# Patient Record
Sex: FEMALE | Race: WHITE | NOT HISPANIC OR LATINO | Employment: PART TIME | URBAN - METROPOLITAN AREA
[De-identification: names, ages, dates, MRNs, and addresses within clinical notes are randomized per-mention and may not be internally consistent; named-entity substitution may affect disease eponyms.]

---

## 2023-03-29 ENCOUNTER — TRANSCRIBE ORDERS (OUTPATIENT)
Dept: ADMINISTRATIVE | Facility: HOSPITAL | Age: 68
End: 2023-03-29
Payer: MEDICARE

## 2023-03-29 DIAGNOSIS — N63.0 MASS OF BREAST, UNSPECIFIED LATERALITY: Primary | ICD-10-CM

## 2023-04-14 ENCOUNTER — HOSPITAL ENCOUNTER (OUTPATIENT)
Dept: MAMMOGRAPHY | Facility: HOSPITAL | Age: 68
Discharge: HOME | End: 2023-04-14
Payer: MEDICARE

## 2023-04-14 ENCOUNTER — HOSPITAL ENCOUNTER (OUTPATIENT)
Dept: ULTRASOUND IMAGING | Facility: HOSPITAL | Age: 68
Discharge: HOME | End: 2023-04-14
Payer: MEDICARE

## 2023-04-14 ENCOUNTER — APPOINTMENT (OUTPATIENT)
Dept: ULTRASOUND IMAGING | Facility: HOSPITAL | Age: 68
End: 2023-04-14
Payer: MEDICARE

## 2023-04-14 DIAGNOSIS — N63.0 MASS OF BREAST, UNSPECIFIED LATERALITY: ICD-10-CM

## 2023-04-14 PROCEDURE — 77066 DX MAMMO INCL CAD BI: CPT

## 2023-04-14 PROCEDURE — G0279 TOMOSYNTHESIS, MAMMO: HCPCS

## 2023-04-14 PROCEDURE — 76642 ULTRASOUND BREAST LIMITED: CPT

## 2023-12-27 ENCOUNTER — HOSPITAL ENCOUNTER (OUTPATIENT)
Facility: HOSPITAL | Age: 68
Setting detail: OBSERVATION
Discharge: HOME | End: 2023-12-28
Attending: EMERGENCY MEDICINE | Admitting: INTERNAL MEDICINE
Payer: MEDICARE

## 2023-12-27 ENCOUNTER — APPOINTMENT (OUTPATIENT)
Dept: CT IMAGING | Facility: HOSPITAL | Age: 68
End: 2023-12-27
Payer: MEDICARE

## 2023-12-27 DIAGNOSIS — R42 DIZZINESS: ICD-10-CM

## 2023-12-27 DIAGNOSIS — R42 VESTIBULAR DIZZINESS INVOLVING LEFT INNER EAR: Primary | ICD-10-CM

## 2023-12-27 LAB
ALBUMIN SERPL-MCNC: 4.6 G/DL (ref 3.5–5.2)
ALBUMIN/GLOB SERPL: 1.9 G/DL
ALP SERPL-CCNC: 85 U/L (ref 39–117)
ALT SERPL W P-5'-P-CCNC: 13 U/L (ref 1–33)
ANION GAP SERPL CALCULATED.3IONS-SCNC: 10 MMOL/L (ref 5–15)
AST SERPL-CCNC: 20 U/L (ref 1–32)
BASOPHILS # BLD AUTO: 0.1 10*3/MM3 (ref 0–0.2)
BASOPHILS NFR BLD AUTO: 0.6 % (ref 0–1.5)
BILIRUB SERPL-MCNC: 0.5 MG/DL (ref 0–1.2)
BUN SERPL-MCNC: 22 MG/DL (ref 8–23)
BUN/CREAT SERPL: 36.1 (ref 7–25)
CALCIUM SPEC-SCNC: 10.3 MG/DL (ref 8.6–10.5)
CHLORIDE SERPL-SCNC: 107 MMOL/L (ref 98–107)
CO2 SERPL-SCNC: 25 MMOL/L (ref 22–29)
CREAT SERPL-MCNC: 0.61 MG/DL (ref 0.57–1)
DEPRECATED RDW RBC AUTO: 42 FL (ref 37–54)
EGFRCR SERPLBLD CKD-EPI 2021: 96.9 ML/MIN/1.73
EOSINOPHIL # BLD AUTO: 0 10*3/MM3 (ref 0–0.4)
EOSINOPHIL NFR BLD AUTO: 0.1 % (ref 0.3–6.2)
ERYTHROCYTE [DISTWIDTH] IN BLOOD BY AUTOMATED COUNT: 13.7 % (ref 12.3–15.4)
GLOBULIN UR ELPH-MCNC: 2.4 GM/DL
GLUCOSE SERPL-MCNC: 145 MG/DL (ref 65–99)
HCT VFR BLD AUTO: 45.6 % (ref 34–46.6)
HGB BLD-MCNC: 14.8 G/DL (ref 12–15.9)
LYMPHOCYTES # BLD AUTO: 1 10*3/MM3 (ref 0.7–3.1)
LYMPHOCYTES NFR BLD AUTO: 10.3 % (ref 19.6–45.3)
MCH RBC QN AUTO: 29.1 PG (ref 26.6–33)
MCHC RBC AUTO-ENTMCNC: 32.6 G/DL (ref 31.5–35.7)
MCV RBC AUTO: 89.4 FL (ref 79–97)
MONOCYTES # BLD AUTO: 0.2 10*3/MM3 (ref 0.1–0.9)
MONOCYTES NFR BLD AUTO: 2.3 % (ref 5–12)
NEUTROPHILS NFR BLD AUTO: 8.2 10*3/MM3 (ref 1.7–7)
NEUTROPHILS NFR BLD AUTO: 86.7 % (ref 42.7–76)
NRBC BLD AUTO-RTO: 0 /100 WBC (ref 0–0.2)
PLATELET # BLD AUTO: 226 10*3/MM3 (ref 140–450)
PMV BLD AUTO: 7.5 FL (ref 6–12)
POTASSIUM SERPL-SCNC: 4.1 MMOL/L (ref 3.5–5.2)
PROT SERPL-MCNC: 7 G/DL (ref 6–8.5)
RBC # BLD AUTO: 5.1 10*6/MM3 (ref 3.77–5.28)
SODIUM SERPL-SCNC: 142 MMOL/L (ref 136–145)
WBC NRBC COR # BLD AUTO: 9.5 10*3/MM3 (ref 3.4–10.8)

## 2023-12-27 PROCEDURE — 70450 CT HEAD/BRAIN W/O DYE: CPT

## 2023-12-27 PROCEDURE — G0378 HOSPITAL OBSERVATION PER HR: HCPCS

## 2023-12-27 PROCEDURE — 80053 COMPREHEN METABOLIC PANEL: CPT | Performed by: EMERGENCY MEDICINE

## 2023-12-27 PROCEDURE — 99284 EMERGENCY DEPT VISIT MOD MDM: CPT

## 2023-12-27 PROCEDURE — 25810000003 SODIUM CHLORIDE 0.9 % SOLUTION: Performed by: EMERGENCY MEDICINE

## 2023-12-27 PROCEDURE — 96375 TX/PRO/DX INJ NEW DRUG ADDON: CPT

## 2023-12-27 PROCEDURE — 97162 PT EVAL MOD COMPLEX 30 MIN: CPT

## 2023-12-27 PROCEDURE — 25010000002 DIPHENHYDRAMINE PER 50 MG: Performed by: EMERGENCY MEDICINE

## 2023-12-27 PROCEDURE — 85025 COMPLETE CBC W/AUTO DIFF WBC: CPT | Performed by: EMERGENCY MEDICINE

## 2023-12-27 PROCEDURE — 96374 THER/PROPH/DIAG INJ IV PUSH: CPT

## 2023-12-27 PROCEDURE — 25010000002 METOCLOPRAMIDE PER 10 MG: Performed by: EMERGENCY MEDICINE

## 2023-12-27 RX ORDER — SODIUM CHLORIDE 0.9 % (FLUSH) 0.9 %
10 SYRINGE (ML) INJECTION AS NEEDED
Status: DISCONTINUED | OUTPATIENT
Start: 2023-12-27 | End: 2023-12-28 | Stop reason: HOSPADM

## 2023-12-27 RX ORDER — ACETAMINOPHEN 325 MG/1
650 TABLET ORAL EVERY 4 HOURS PRN
Status: DISCONTINUED | OUTPATIENT
Start: 2023-12-27 | End: 2023-12-28 | Stop reason: HOSPADM

## 2023-12-27 RX ORDER — MELOXICAM 15 MG/1
15 TABLET ORAL NIGHTLY
COMMUNITY

## 2023-12-27 RX ORDER — ATORVASTATIN CALCIUM 20 MG/1
20 TABLET, FILM COATED ORAL NIGHTLY
COMMUNITY

## 2023-12-27 RX ORDER — PROCHLORPERAZINE EDISYLATE 5 MG/ML
10 INJECTION INTRAMUSCULAR; INTRAVENOUS EVERY 6 HOURS PRN
Status: DISCONTINUED | OUTPATIENT
Start: 2023-12-27 | End: 2023-12-28 | Stop reason: HOSPADM

## 2023-12-27 RX ORDER — ATORVASTATIN CALCIUM 10 MG/1
10 TABLET, FILM COATED ORAL NIGHTLY
Status: DISCONTINUED | OUTPATIENT
Start: 2023-12-27 | End: 2023-12-28 | Stop reason: HOSPADM

## 2023-12-27 RX ORDER — DIPHENHYDRAMINE HYDROCHLORIDE 50 MG/ML
25 INJECTION INTRAMUSCULAR; INTRAVENOUS ONCE
Status: COMPLETED | OUTPATIENT
Start: 2023-12-27 | End: 2023-12-27

## 2023-12-27 RX ORDER — BISACODYL 5 MG/1
5 TABLET, DELAYED RELEASE ORAL DAILY PRN
Status: DISCONTINUED | OUTPATIENT
Start: 2023-12-27 | End: 2023-12-28 | Stop reason: HOSPADM

## 2023-12-27 RX ORDER — SODIUM CHLORIDE 9 MG/ML
40 INJECTION, SOLUTION INTRAVENOUS AS NEEDED
Status: DISCONTINUED | OUTPATIENT
Start: 2023-12-27 | End: 2023-12-28 | Stop reason: HOSPADM

## 2023-12-27 RX ORDER — ONDANSETRON 4 MG/1
4 TABLET, ORALLY DISINTEGRATING ORAL EVERY 6 HOURS PRN
Status: DISCONTINUED | OUTPATIENT
Start: 2023-12-27 | End: 2023-12-28 | Stop reason: HOSPADM

## 2023-12-27 RX ORDER — MELOXICAM 15 MG/1
15 TABLET ORAL NIGHTLY
Status: DISCONTINUED | OUTPATIENT
Start: 2023-12-27 | End: 2023-12-28 | Stop reason: HOSPADM

## 2023-12-27 RX ORDER — MECLIZINE HYDROCHLORIDE 25 MG/1
25 TABLET ORAL ONCE
Status: COMPLETED | OUTPATIENT
Start: 2023-12-27 | End: 2023-12-27

## 2023-12-27 RX ORDER — AMOXICILLIN 250 MG
2 CAPSULE ORAL 2 TIMES DAILY
Status: DISCONTINUED | OUTPATIENT
Start: 2023-12-27 | End: 2023-12-28 | Stop reason: HOSPADM

## 2023-12-27 RX ORDER — POLYETHYLENE GLYCOL 3350 17 G/17G
17 POWDER, FOR SOLUTION ORAL DAILY PRN
Status: DISCONTINUED | OUTPATIENT
Start: 2023-12-27 | End: 2023-12-28 | Stop reason: HOSPADM

## 2023-12-27 RX ORDER — METOCLOPRAMIDE HYDROCHLORIDE 5 MG/ML
10 INJECTION INTRAMUSCULAR; INTRAVENOUS ONCE
Status: COMPLETED | OUTPATIENT
Start: 2023-12-27 | End: 2023-12-27

## 2023-12-27 RX ORDER — MECLIZINE HYDROCHLORIDE 25 MG/1
25 TABLET ORAL 3 TIMES DAILY PRN
Status: DISCONTINUED | OUTPATIENT
Start: 2023-12-27 | End: 2023-12-28 | Stop reason: HOSPADM

## 2023-12-27 RX ORDER — HYDRALAZINE HYDROCHLORIDE 20 MG/ML
10 INJECTION INTRAMUSCULAR; INTRAVENOUS EVERY 6 HOURS PRN
Status: DISCONTINUED | OUTPATIENT
Start: 2023-12-27 | End: 2023-12-28 | Stop reason: HOSPADM

## 2023-12-27 RX ORDER — DIPHENHYDRAMINE HYDROCHLORIDE 50 MG/ML
25 INJECTION INTRAMUSCULAR; INTRAVENOUS EVERY 6 HOURS PRN
Status: DISCONTINUED | OUTPATIENT
Start: 2023-12-27 | End: 2023-12-28 | Stop reason: HOSPADM

## 2023-12-27 RX ORDER — SODIUM CHLORIDE 0.9 % (FLUSH) 0.9 %
10 SYRINGE (ML) INJECTION EVERY 12 HOURS SCHEDULED
Status: DISCONTINUED | OUTPATIENT
Start: 2023-12-27 | End: 2023-12-28 | Stop reason: HOSPADM

## 2023-12-27 RX ORDER — CETIRIZINE HYDROCHLORIDE 10 MG/1
10 TABLET ORAL NIGHTLY
COMMUNITY

## 2023-12-27 RX ORDER — CETIRIZINE HYDROCHLORIDE 10 MG/1
10 TABLET ORAL NIGHTLY
Status: DISCONTINUED | OUTPATIENT
Start: 2023-12-27 | End: 2023-12-28 | Stop reason: HOSPADM

## 2023-12-27 RX ORDER — BISACODYL 10 MG
10 SUPPOSITORY, RECTAL RECTAL DAILY PRN
Status: DISCONTINUED | OUTPATIENT
Start: 2023-12-27 | End: 2023-12-28 | Stop reason: HOSPADM

## 2023-12-27 RX ADMIN — METOCLOPRAMIDE 10 MG: 5 INJECTION, SOLUTION INTRAMUSCULAR; INTRAVENOUS at 15:48

## 2023-12-27 RX ADMIN — Medication 10 ML: at 20:24

## 2023-12-27 RX ADMIN — MECLIZINE HYDROCHLORIDE 25 MG: 25 TABLET ORAL at 12:10

## 2023-12-27 RX ADMIN — DIPHENHYDRAMINE HYDROCHLORIDE 25 MG: 50 INJECTION, SOLUTION INTRAMUSCULAR; INTRAVENOUS at 15:49

## 2023-12-27 RX ADMIN — MELOXICAM 15 MG: 15 TABLET ORAL at 22:09

## 2023-12-27 RX ADMIN — ATORVASTATIN CALCIUM 10 MG: 10 TABLET, FILM COATED ORAL at 22:09

## 2023-12-27 RX ADMIN — SODIUM CHLORIDE 1000 ML: 9 INJECTION, SOLUTION INTRAVENOUS at 15:48

## 2023-12-27 RX ADMIN — CETIRIZINE HYDROCHLORIDE 10 MG: 10 TABLET, FILM COATED ORAL at 22:09

## 2023-12-27 NOTE — ED PROVIDER NOTES
"Subjective   History of Present Illness  68-year-old female presents for dizziness.  Started yesterday.  No headache.  States it is really only when she goes to get up.  No headache.  No numbness or weakness.  No speech or vision changes.  Had similar symptoms previously once and was post to get outpatient follow-up with PT but felt better before hand so did not go.      Review of Systems  See HPI.  No past medical history on file.    Allergies   Allergen Reactions    Codeine GI Intolerance and Nausea And Vomiting    Erythromycin Nausea And Vomiting    Metronidazole GI Intolerance and Nausea And Vomiting       Past Surgical History:   Procedure Laterality Date    HYSTERECTOMY      REDUCTION MAMMAPLASTY         Family History   Problem Relation Age of Onset    Breast cancer Sister     Colon cancer Paternal Uncle        Social History     Socioeconomic History    Marital status:            Objective   Physical Exam  Constitutional:  No acute distress.  Head:  Atraumatic.  Normocephalic.   Eyes:  No scleral icterus. Normal conjunctivae  ENT:  Moist mucosa.  No nasal discharge present.  Cardiovascular:  Well perfused.  Equal pulses.  Regular rate.  Normal capillary refill.    Pulmonary/Chest:  No respiratory distress.  Airway patent.  No tachypnea.  No accessory muscle usage.    Abdominal:  Nondistended. Nontender.   Neurological:  Alert and oriented to person place time and situation. PERRL.  EOMI.  Cranial nerves II-XII are intact.  Strength is equal and 5/5 in the upper and lower extremities bilaterally.  No sensory deficits to light touch.  No pronator drift.  Normal speech.  Normal cerebellar function during finger-nose-finger and heel to shin.  Patient with abnormal gait, listing to mostly right but sometimes left on exam.        Procedures           ED Course      /66   Pulse 65   Temp 97.5 °F (36.4 °C) (Oral)   Resp 20   Ht 163.8 cm (64.5\")   Wt 73.9 kg (162 lb 14.7 oz)   SpO2 95%   BMI " 27.53 kg/m²   Labs Reviewed   COMPREHENSIVE METABOLIC PANEL - Abnormal; Notable for the following components:       Result Value    Glucose 145 (*)     BUN/Creatinine Ratio 36.1 (*)     All other components within normal limits    Narrative:     GFR Normal >60  Chronic Kidney Disease <60  Kidney Failure <15     CBC WITH AUTO DIFFERENTIAL - Abnormal; Notable for the following components:    Neutrophil % 86.7 (*)     Lymphocyte % 10.3 (*)     Monocyte % 2.3 (*)     Eosinophil % 0.1 (*)     Neutrophils, Absolute 8.20 (*)     All other components within normal limits   CBC AND DIFFERENTIAL    Narrative:     The following orders were created for panel order CBC & Differential.  Procedure                               Abnormality         Status                     ---------                               -----------         ------                     CBC Auto Differential[[id number]]        Abnormal            Final result                 Please view results for these tests on the individual orders.     Medications   sodium chloride 0.9 % flush 10 mL (has no administration in time range)   sodium chloride 0.9 % flush 10 mL (has no administration in time range)   sodium chloride 0.9 % flush 10 mL (has no administration in time range)   sodium chloride 0.9 % infusion 40 mL (has no administration in time range)   sennosides-docusate (PERICOLACE) 8.6-50 MG per tablet 2 tablet (has no administration in time range)     And   polyethylene glycol (MIRALAX) packet 17 g (has no administration in time range)     And   bisacodyl (DULCOLAX) EC tablet 5 mg (has no administration in time range)     And   bisacodyl (DULCOLAX) suppository 10 mg (has no administration in time range)   Potassium Replacement - Follow Nurse / BPA Driven Protocol (has no administration in time range)   Magnesium Standard Dose Replacement - Follow Nurse / BPA Driven Protocol (has no administration in time range)   Phosphorus Replacement - Follow Nurse / BPA  Driven Protocol (has no administration in time range)   Calcium Replacement - Follow Nurse / BPA Driven Protocol (has no administration in time range)   acetaminophen (TYLENOL) tablet 650 mg (has no administration in time range)   prochlorperazine (COMPAZINE) injection 10 mg (has no administration in time range)   ondansetron ODT (ZOFRAN-ODT) disintegrating tablet 4 mg (has no administration in time range)   diphenhydrAMINE (BENADRYL) injection 25 mg (has no administration in time range)   meclizine (ANTIVERT) tablet 25 mg (has no administration in time range)   hydrALAZINE (APRESOLINE) injection 10 mg (has no administration in time range)   meclizine (ANTIVERT) tablet 25 mg (25 mg Oral Given 12/27/23 1210)   metoclopramide (REGLAN) injection 10 mg (10 mg Intravenous Given 12/27/23 1548)   diphenhydrAMINE (BENADRYL) injection 25 mg (25 mg Intravenous Given 12/27/23 1549)   sodium chloride 0.9 % bolus 1,000 mL (1,000 mL Intravenous New Bag 12/27/23 1548)     CT Head Without Contrast    Result Date: 12/27/2023  Impression: 1. No acute intracranial abnormality. 2. Generalized cerebral volume loss. Electronically Signed: Toñito Aleaxnder MD  12/27/2023 4:24 PM EST  Workstation ID: [id number]                                          Medical Decision Making  Problems Addressed:  Dizziness: complicated acute illness or injury    Amount and/or Complexity of Data Reviewed  Labs: ordered.  Radiology: ordered.    Risk  Prescription drug management.  Decision regarding hospitalization.    My interpretation of CT head is no intracranial hemorrhage.  See above radiology interpretation.    Patient with nonfocal neuroexam.  Largely asymptomatic when lying down but when standing patient not significantly symptomatic.  PT seen but patient without significant improvement.  Patient repeatedly grasping the wall when ambulating.  Suspect that this is peripheral in etiology given exam, however patient having significant difficulty ambulating  here on my exam.  Has to go up flight of stairs at home.  Believe would benefit from further treatment.  Nausea improved with Reglan.  Excepted by Taylor with [employer] service.    Final diagnoses:   Dizziness       ED Disposition  ED Disposition       ED Disposition   Decision to Admit    Condition   --    Comment   Level of Care: Telemetry [5]   Diagnosis: Vertigo [293178]   Admitting Physician: ABIGAIL WARE [[id number]]   Attending Physician: ABIGAIL WARE [[id number]]                 No follow-up provider specified.       Medication List      No changes were made to your prescriptions during this visit.            Salo Cosby MD  12/27/23 1800

## 2023-12-27 NOTE — THERAPY EVALUATION
Patient Name: Elisa PETIT  : 1955    MRN: [id number]                              Today's Date: 2023       Admit Date: 2023    Visit Dx: No diagnosis found.  There is no problem list on file for this patient.    No past medical history on file.  Past Surgical History:   Procedure Laterality Date    HYSTERECTOMY      REDUCTION MAMMAPLASTY        General Information       Row Name 23 1608          Physical Therapy Time and Intention    Document Type evaluation  -MB     Mode of Treatment physical therapy  -MB       Row Name 23 1608          General Information    Patient Profile Reviewed yes  -MB     Prior Level of Function independent:;all household mobility;community mobility;gait;transfer;ADL's;home management;driving;work  -MB     Existing Precautions/Restrictions no known precautions/restrictions  -MB     Barriers to Rehab none identified  -MB       Row Name 23 1608          Living Environment    People in Home spouse  -MB       Row Name 23 1608          Home Main Entrance    Number of Stairs, Main Entrance two  -MB       Row Name 23 1608          Stairs Within Home, Primary    Stairs, Within Home, Primary flight to upstairs bedroom  -MB     Stair Railings, Within Home, Primary railings safe and in good condition  -MB       Row Name 23 1608          Cognition    Orientation Status (Cognition) oriented x 4  -MB       Row Name 23 1608          Safety Issues, Functional Mobility    Impairments Affecting Function (Mobility) balance;endurance/activity tolerance;postural/trunk control  -MB               User Key  (r) = Recorded By, (t) = Taken By, (c) = Cosigned By      Initials Name Provider Type    Alfredo Vega, PT Physical Therapist                   Mobility       Row Name 23 1609          Bed Mobility    Bed Mobility bed mobility (all) activities  -MB     All Activities, [place] (Bed Mobility) standby assist  -MB     Assistive  Device (Bed Mobility) head of bed elevated  -MB       Row Name 12/27/23 1609          Bed-Chair Transfer    Bed-Chair [place] (Transfers) standby assist  -MB     Comment, (Bed-Chair Transfer) no AD  -MB       Row Name 12/27/23 1609          Sit-Stand Transfer    Sit-Stand [place] (Transfers) standby assist  -MB     Comment, (Sit-Stand Transfer) no AD  -MB       Row Name 12/27/23 1609          Gait/Stairs (Locomotion)    [place] Level (Gait) standby assist  -MB     Patient was able to Ambulate yes  -MB     Distance in Feet (Gait) 50  -MB     Comment, (Gait/Stairs) 2x25' SBA with HHA intermittently to use the restroom  -MB               User Key  (r) = Recorded By, (t) = Taken By, (c) = Cosigned By      Initials Name Provider Type    Alfredo Vega, PT Physical Therapist                   Obj/Interventions       Row Name 12/27/23 1610          Range of Motion Comprehensive    General Range of Motion no range of motion deficits identified  -MB       Row Name 12/27/23 1610          Strength Comprehensive (MMT)    General Manual Muscle Testing (MMT) Assessment no strength deficits identified  -MB       Row Name 12/27/23 1610          Balance    Balance Assessment sitting static balance;sit to stand dynamic balance;sitting dynamic balance;standing static balance;standing dynamic balance  -MB     Static Sitting Balance independent  -MB     Dynamic Sitting Balance independent  -MB     Position, Sitting Balance unsupported;sitting edge of bed  -MB     Sit to Stand Dynamic Balance independent  -MB     Static Standing Balance independent  -MB     Dynamic Standing Balance standby assist;contact guard  -MB       Row Name 12/27/23 1610          Sensory Assessment (Somatosensory)    Sensory Assessment (Somatosensory) sensation intact  -MB               User Key  (r) = Recorded By, (t) = Taken By, (c) = Cosigned By      Initials Name Provider Type    Alfredo Vega, PT Physical Therapist                    Goals/Plan    No documentation.                  Clinical Impression       Row Name 12/27/23 1610          Pain    Pretreatment Pain Rating 0/10 - no pain  -MB     Posttreatment Pain Rating 0/10 - no pain  -MB       Row Name 12/27/23 1622 12/27/23 1610       Plan of Care Review    Plan of Care Reviewed With -- patient;spouse  -MB    Progress -- no change  -MB    Outcome Evaluation Pt is a 69 y/o F admitted to [facility] on 12/27/23 with complaints of dizziness and nausea that began yesterday afternoon. She describes the symptoms as room-spinning dizziness, worse with positional changes. Believes she was had vertigo before, was planning on receiving PT, but symptoms resolved prior to appointment. PT consulted for evaluation and treatment of dizziness symptoms. She is currently living in multi-level home with 2-step entry and flight to second story bedroom. At baseline, she is IND with all mobility, driving, and working part-time at vet clinic. This date, she is AAOx4 and lying supine in bed. She completes all mobility this date SBA and amb 2x25' SBA with no AD this date to use the restroom. She is mildly dizzy with ambulation, req intermittent usage of HHA during ambulation assessment. Education was provided on RW usage at home. During vestibular exam pt was negative with oculomotor testing, no abnormalities with VOR assessment, negative with orthostatic vital signs. However, she was mildly (+) for BBPV when assessing the [place]-Hallpike bilaterally. She was treated with the Epley maneuver 2x this date with very mild improvement of symptoms. It is also worth noting that her symptoms were not exacerbated during the treatment. Upon re-test, she was (-) for BPPV on the R canal, mildly (+) again on the L canal, despite decrease in amplitude and intensity of nystagmus. PT is planning a referral to OPPT for vestibular therapy for continued treatment of condition if symptoms don't resolve. Education provided on use of RW for short  term at d/c.  -MB --      Row Name 12/27/23 1610          Therapy Assessment/Plan (PT)    Criteria for Skilled Interventions Met (PT) no  -MB     Therapy Frequency (PT) evaluation only  -MB     Predicted Duration of Therapy Intervention (PT) eval only  -MB       Row Name 12/27/23 1610          Vital Signs    Pre Systolic BP Rehab 137  -MB     Pre Treatment Diastolic BP 83  -MB     Intra Systolic BP Rehab 143  -MB     Intra Treatment Diastolic BP 85  -MB     Post Systolic BP Rehab 142  -MB     Post Treatment Diastolic BP 80  -MB     O2 Delivery Pre Treatment room air  -MB     O2 Delivery Intra Treatment room air  -MB     O2 Delivery Post Treatment room air  -MB     Pre Patient Position Supine  -MB     Intra Patient Position Standing  -MB     Post Patient Position Supine  -MB       Row Name 12/27/23 1610          Positioning and Restraints    Pre-Treatment Position in bed  -MB     Post Treatment Position bed  -MB     In Bed notified nsg;supine;call light within reach;encouraged to call for assist  -MB               User Key  (r) = Recorded By, (t) = Taken By, (c) = Cosigned By      Initials Name Provider Type    Alfredo Vega, PT Physical Therapist                   Outcome Measures       Row Name 12/27/23 1611          How much help from another person do you currently need...    Turning from your back to your side while in flat bed without using bedrails? 4  -MB     Moving from lying on back to sitting on the side of a flat bed without bedrails? 4  -MB     Moving to and from a bed to a chair (including a wheelchair)? 4  -MB     Standing up from a chair using your arms (e.g., wheelchair, bedside chair)? 4  -MB     Climbing 3-5 steps with a railing? 3  -MB     To walk in hospital room? 3  -MB     AM-PAC 6 Clicks Score (PT) 22  -MB     Highest Level of Mobility Goal 7 --> Walk 25 feet or more  -MB       Row Name 12/27/23 1611          Functional Assessment    Outcome Measure Options AM-PAC 6 Clicks Basic Mobility  (PT)  -MB               User Key  (r) = Recorded By, (t) = Taken By, (c) = Cosigned By      Initials Name Provider Type    Alfredo Vega PT Physical Therapist                                 Physical Therapy Education       Title: PT OT SLP Therapies (Done)       Topic: Physical Therapy (Done)       Point: Mobility training (Done)       Learning Progress Summary             Patient Acceptance, E,TB, VU by MB at 12/27/2023 1611                         Point: Body mechanics (Done)       Learning Progress Summary             Patient Acceptance, E,TB, VU by MB at 12/27/2023 1611                                         User Key       Initials Effective Dates Name Provider Type Discipline    MB 06/06/23 -  Alfredo Wood PT Physical Therapist PT                  PT Recommendation and Plan     Plan of Care Reviewed With: patient, spouse  Progress: no change  Outcome Evaluation: Pt is a 67 y/o F admitted to [facility] on 12/27/23 with complaints of dizziness and nausea that began yesterday afternoon. She describes the symptoms as room-spinning dizziness, worse with positional changes. Believes she was had vertigo before, was planning on receiving PT, but symptoms resolved prior to appointment. PT consulted for evaluation and treatment of dizziness symptoms. She is currently living in multi-level home with 2-step entry and flight to second story bedroom. At baseline, she is IND with all mobility, driving, and working part-time at vet clinic. This date, she is AAOx4 and lying supine in bed. She completes all mobility this date SBA and amb 2x25' SBA with no AD this date to use the restroom. She is mildly dizzy with ambulation, req intermittent usage of HHA during ambulation assessment. Education was provided on RW usage at home. During vestibular exam pt was negative with oculomotor testing, no abnormalities with VOR assessment, negative with orthostatic vital signs. However, she was mildly (+) for BBPV when assessing the  [place]-Hallpike bilaterally. She was treated with the Epley maneuver 2x this date with very mild improvement of symptoms. It is also worth noting that her symptoms were not exacerbated during the treatment. Upon re-test, she was (-) for BPPV on the R canal, mildly (+) again on the L canal, despite decrease in amplitude and intensity of nystagmus. PT is planning a referral to OPPT for vestibular therapy for continued treatment of condition if symptoms don't resolve. Education provided on use of RW for short term at d/c.     Time Calculation:   PT Evaluation Complexity  History, PT Evaluation Complexity: 1-2 personal factors and/or comorbidities  Examination of Body Systems (PT Eval Complexity): total of 3 or more elements  Clinical Presentation (PT Evaluation Complexity): evolving  Clinical Decision Making (PT Evaluation Complexity): moderate complexity  Overall Complexity (PT Evaluation Complexity): moderate complexity     PT Charges       Row Name 12/27/23 1611             Time Calculation    Start Time 1411  -MB      Stop Time 1455  -MB      Time Calculation (min) 44 min  -MB      PT Received On 12/27/23  -MB         Time Calculation- PT    Total Timed Code Minutes- PT 0 minute(s)  -MB                User Key  (r) = Recorded By, (t) = Taken By, (c) = Cosigned By      Initials Name Provider Type    Alfredo Vega, PT Physical Therapist                  Therapy Charges for Today       Code Description Service Date Service Provider Modifiers Qty    [id number] HC-PT EVAL MOD COMPLEXITY 5 12/27/2023 Alfredo Wood, PT  1            PT G-Codes  Outcome Measure Options: AM-PAC 6 Clicks Basic Mobility (PT)  AM-PAC 6 Clicks Score (PT): 22  PT Discharge Summary  Anticipated Discharge Disposition (PT): home with outpatient therapy services    Alfredo Wood PT  12/27/2023

## 2023-12-27 NOTE — PLAN OF CARE
Goal Outcome Evaluation:  Plan of Care Reviewed With: patient, spouse        Progress: no change   Pt is a 69 y/o F admitted to [facility] on 12/27/23 with complaints of dizziness and nausea that began yesterday afternoon. She describes the symptoms as room-spinning dizziness, worse with positional changes. Believes she was had vertigo before, was planning on receiving PT, but symptoms resolved prior to appointment. PT consulted for evaluation and treatment of dizziness symptoms. She is currently living in multi-level home with 2-step entry and flight to second story bedroom. 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 This date, she is AAOx4 and lying supine in bed. She completes all mobility this date SBA and amb 2x25' SBA with no AD this date to use the restroom. She is mildly dizzy with ambulation, req intermittent usage of HHA during ambulation assessment. Education was provided on RW usage at home. During vestibular exam pt was negative with oculomotor testing, no abnormalities with VOR assessment, negative with orthostatic vital signs. However, she was mildly (+) for BBPV when assessing the [place]-Hallpike bilaterally. She was treated with the Epley maneuver 2x this date with very mild improvement of symptoms. It is also worth noting that her symptoms were not exacerbated during the treatment. Upon re-test, she was (-) for BPPV on the R canal, mildly (+) again on the L canal, despite decrease in amplitude and intensity of nystagmus. PT is planning a referral to OPPT for vestibular therapy for continued treatment of condition if symptoms don't resolve. Education provided on use of RW for short term at d/c.    Anticipated Discharge Disposition (PT): home with outpatient therapy services

## 2023-12-27 NOTE — Clinical Note
Level of Care: Med/Surg [1]   Admitting Physician: ABIGAIL WARE [[id number]]   Attending Physician: ABIGAIL WARE [[id number]]

## 2023-12-28 VITALS
TEMPERATURE: 98.3 F | SYSTOLIC BLOOD PRESSURE: 124 MMHG | RESPIRATION RATE: 16 BRPM | HEART RATE: 76 BPM | DIASTOLIC BLOOD PRESSURE: 67 MMHG | HEIGHT: 65 IN | BODY MASS INDEX: 26.52 KG/M2 | OXYGEN SATURATION: 95 % | WEIGHT: 159.17 LBS

## 2023-12-28 PROCEDURE — G0378 HOSPITAL OBSERVATION PER HR: HCPCS

## 2023-12-28 PROCEDURE — 96376 TX/PRO/DX INJ SAME DRUG ADON: CPT

## 2023-12-28 PROCEDURE — 25010000002 DIPHENHYDRAMINE PER 50 MG: Performed by: NURSE PRACTITIONER

## 2023-12-28 RX ORDER — ONDANSETRON 4 MG/1
4 TABLET, ORALLY DISINTEGRATING ORAL EVERY 6 HOURS PRN
Qty: 10 TABLET | Refills: 0 | Status: SHIPPED | OUTPATIENT
Start: 2023-12-28 | End: 2023-12-28 | Stop reason: SDUPTHER

## 2023-12-28 RX ORDER — ACETAMINOPHEN 325 MG/1
650 TABLET ORAL EVERY 4 HOURS PRN
Start: 2023-12-28

## 2023-12-28 RX ORDER — MECLIZINE HYDROCHLORIDE 25 MG/1
25 TABLET ORAL 3 TIMES DAILY PRN
Qty: 20 TABLET | Refills: 1 | Status: SHIPPED | OUTPATIENT
Start: 2023-12-28

## 2023-12-28 RX ORDER — MECLIZINE HYDROCHLORIDE 25 MG/1
25 TABLET ORAL 3 TIMES DAILY PRN
Qty: 20 TABLET | Refills: 1 | Status: SHIPPED | OUTPATIENT
Start: 2023-12-28 | End: 2023-12-28 | Stop reason: SDUPTHER

## 2023-12-28 RX ORDER — ONDANSETRON 4 MG/1
4 TABLET, ORALLY DISINTEGRATING ORAL EVERY 6 HOURS PRN
Qty: 10 TABLET | Refills: 0 | Status: SHIPPED | OUTPATIENT
Start: 2023-12-28

## 2023-12-28 RX ADMIN — Medication 10 ML: at 08:32

## 2023-12-28 RX ADMIN — DIPHENHYDRAMINE HYDROCHLORIDE 25 MG: 50 INJECTION, SOLUTION INTRAMUSCULAR; INTRAVENOUS at 00:00

## 2023-12-28 RX ADMIN — MECLIZINE HYDROCHLORIDE 25 MG: 25 TABLET ORAL at 00:00

## 2023-12-28 NOTE — PLAN OF CARE
Goal Outcome Evaluation:           Progress: no change  Outcome Evaluation: Pt admitted to [facility] overflow beds from the ED on RA. Home medications reordered per on call provider. Pt resting comfortably in bed overnight. Dizziness exacerbated by ambulation/movement but completely resolved while laying in bed. VSS. Plan for follow up with primary today.

## 2023-12-28 NOTE — DISCHARGE SUMMARY
Date of Discharge:  12/28/2023    Discharge Diagnosis: Benign positional vertigo    Presenting Problem/History of Present Illness  Active Hospital Problems    Diagnosis  POA    **Vertigo [R42]  Yes      Resolved Hospital Problems   No resolved problems to display.          Hospital Course  Patient is a 68 y.o. female with minimal past medical history who presented with spinning sensation and nausea associated with change in head position.  Symptomatology and physical exam are consistent with benign positional vertigo.  Head CT was unremarkable for acute changes.  She did well with PT maneuvers and meclizine.  At the time of discharge she is feeling 90% better and has been able to ambulate independently to the bathroom, down the tovar.  She has very minimal residual spinning sensation when she gets up from a lying position.  She is comfortable going home with a prescription for meclizine.  I have advised her to watch YouTube videos of Lentz-Daroff exercises and perform these at home to help the symptoms resolve completely..      Procedures Performed         Consults:   Consults       No orders found for last 30 day(s).            Pertinent Test Results:    Lab Results (most recent)       Procedure Component Value Units Date/Time    Comprehensive Metabolic Panel [[id number]]  (Abnormal) Collected: 12/27/23 1548    Specimen: Blood Updated: 12/27/23 1625     Glucose 145 mg/dL      BUN 22 mg/dL      Creatinine 0.61 mg/dL      Sodium 142 mmol/L      Potassium 4.1 mmol/L      Chloride 107 mmol/L      CO2 25.0 mmol/L      Calcium 10.3 mg/dL      Total Protein 7.0 g/dL      Albumin 4.6 g/dL      ALT (SGPT) 13 U/L      AST (SGOT) 20 U/L      Alkaline Phosphatase 85 U/L      Total Bilirubin 0.5 mg/dL      Globulin 2.4 gm/dL      A/G Ratio 1.9 g/dL      BUN/Creatinine Ratio 36.1     Anion Gap 10.0 mmol/L      eGFR 96.9 mL/min/1.73     Narrative:      GFR Normal >60  Chronic Kidney Disease <60  Kidney Failure <15      CBC &  Differential [[id number]]  (Abnormal) Collected: 12/27/23 1548    Specimen: Blood Updated: 12/27/23 1559    Narrative:      The following orders were created for panel order CBC & Differential.  Procedure                               Abnormality         Status                     ---------                               -----------         ------                     CBC Auto Differential[[id number]]        Abnormal            Final result                 Please view results for these tests on the individual orders.    CBC Auto Differential [[id number]]  (Abnormal) Collected: 12/27/23 1548    Specimen: Blood Updated: 12/27/23 1559     WBC 9.50 10*3/mm3      RBC 5.10 10*6/mm3      Hemoglobin 14.8 g/dL      Hematocrit 45.6 %      MCV 89.4 fL      MCH 29.1 pg      MCHC 32.6 g/dL      RDW 13.7 %      RDW-SD 42.0 fl      MPV 7.5 fL      Platelets 226 10*3/mm3      Neutrophil % 86.7 %      Lymphocyte % 10.3 %      Monocyte % 2.3 %      Eosinophil % 0.1 %      Basophil % 0.6 %      Neutrophils, Absolute 8.20 10*3/mm3      Lymphocytes, Absolute 1.00 10*3/mm3      Monocytes, Absolute 0.20 10*3/mm3      Eosinophils, Absolute 0.00 10*3/mm3      Basophils, Absolute 0.10 10*3/mm3      nRBC 0.0 /100 WBC                          Condition on Discharge: Improved, stable    Vital Signs  Temp:  [98.3 °F (36.8 °C)-98.7 °F (37.1 °C)] 98.3 °F (36.8 °C)  Heart Rate:  [65-92] 76  Resp:  [12-16] 16  BP: (107-152)/(59-88) 124/67    Physical Exam:     General Appearance:    Alert, cooperative, in no acute distress   Head:    Normocephalic, without obvious abnormality, atraumatic   Eyes:            Lids and lashes normal, conjunctivae and sclerae normal, no   icterus, no pallor, corneas clear, PERRLA   Ears:    Ears appear intact with no abnormalities noted   Throat:   No oral lesions, no thrush, oral mucosa moist   Neck:   No adenopathy, supple, trachea midline, no thyromegaly, no   carotid bruit, no JVD   Lungs:     Clear to  auscultation,respirations regular, even and                  unlabored    Heart:    Regular rhythm and normal rate, normal S1 and S2, no            murmur, no gallop, no rub, no click   Chest Wall:    No abnormalities observed   Abdomen:     Normal bowel sounds, no masses, no organomegaly, soft        non-tender, non-distended, no guarding, no rebound                tenderness   Extremities:   Moves all extremities well, no edema, no cyanosis, no             redness   Pulses:   Pulses palpable and equal bilaterally   Skin:   No bleeding, bruising or rash   Lymph nodes:   No palpable adenopathy   Neurologic:   Cranial nerves 2 - 12 grossly intact, sensation intact, DTR       present and equal bilaterally       Discharge Disposition  Home or Self Care    Discharge Medications     Discharge Medications        New Medications        Instructions Start Date   acetaminophen 325 MG tablet  Commonly known as: TYLENOL   650 mg, Oral, Every 4 Hours PRN      meclizine 25 MG tablet  Commonly known as: ANTIVERT   25 mg, Oral, 3 Times Daily PRN      ondansetron ODT 4 MG disintegrating tablet  Commonly known as: ZOFRAN-ODT   4 mg, Translingual, Every 6 Hours PRN             Continue These Medications        Instructions Start Date   atorvastatin 20 MG tablet  Commonly known as: LIPITOR   20 mg, Oral, Nightly      cetirizine 10 MG tablet  Commonly known as: zyrTEC   10 mg, Oral, Nightly      meloxicam 15 MG tablet  Commonly known as: MOBIC   15 mg, Oral, Nightly               Discharge Diet:     Activity at Discharge:     Follow-up Appointments  No future appointments.  Additional Instructions for the Follow-ups that You Need to Schedule       Ambulatory Referral to Physical Therapy Vestibular   As directed      Add Scheduling Instructions here    Specialty needed: Vestibular   Follow-up needed: Yes        Discharge Follow-up with PCP   As directed       Currently Documented PCP:    Aruna Esquivel MD    PCP Phone Number:     [phone]     Follow Up Details: At next regularly scheduled appointment.  Call office if you need to be seen sooner.  Watch Lentz-Juhi exercises on Youtube and start those at home if dizziness returns.                Test Results Pending at Discharge       Heather Espinoza MD  12/28/23  14:03 EST    Time: Discharge 25 min

## 2023-12-28 NOTE — NURSING NOTE
Verbal care handoff called to Desiree KEENE on the floor. Pt and family made aware of transfer to floor. Pt transferred to floor via transport tech.

## 2023-12-28 NOTE — CASE MANAGEMENT/SOCIAL WORK
Discharge Planning Assessment  [facility]     Patient Name: Elisa PETIT  MRN: [id number]  Today's Date: 12/28/2023    Admit Date: 12/27/2023    Plan: Home w/ OPPT- vestibular. Referral- [facility]   Discharge Needs Assessment       Row Name 12/28/23 1509       Living Environment    People in Home spouse    Name(s) of People in Home janice Leong    Current Living Arrangements home    Potentially Unsafe Housing Conditions none    Primary Care Provided by self    Provides Primary Care For no one    Family Caregiver if Needed spouse    Family Caregiver Names Salo    Quality of Family Relationships helpful;involved;supportive    Able to Return to Prior Arrangements yes       Resource/Environmental Concerns    Resource/Environmental Concerns none    Transportation Concerns none       Transition Planning    Patient/Family Anticipates Transition to home with family    Patient/Family Anticipated Services at Transition none    Transportation Anticipated car, drives self;family or friend will provide       Discharge Needs Assessment    Readmission Within the Last 30 Days no previous admission in last 30 days    Equipment Currently Used at Home scales;wheelchair;walker, rolling    Anticipated Changes Related to Illness none    Equipment Needed After Discharge none    Outpatient/Agency/Support Group Needs outpatient therapy    Discharge Facility/Level of Care Needs outpatient therapy    Provided Post Acute Provider List? Yes    Post Acute Provider List Outpatient Therapy    Delivered To Patient    Method of Delivery In person                   Discharge Plan       Row Name 12/28/23 1510       Plan    Plan Home w/ OPPT- vestibular. Referral- [facility]    Plan Comments CM met with patient at the bedside. Confirmed PCP, insurance, and pharmacy. Patient denies any difficulty affording medications. Patient is not current with any HHC/OPPT/OT services. Patient lives at home with spouse, is IADLS, and drives.  PT recommend OPPT- vestibular. CM took patient list and was given the choice of [facility]PP[facility]. CM created DCP report and faxed order to [facility] OPPT office. DC orders noted.                  Continued Care and Services - Admitted Since 12/27/2023    Coordination has not been started for this encounter.       Expected Discharge Date and Time       Expected Discharge Date Expected Discharge Time    Dec 28, 2023            Demographic Summary       Row Name 12/28/23 1508       General Information    Admission Type observation    Arrived From emergency department    Required Notices Provided Observation Status Notice    Referral Source admission list    Reason for Consult discharge planning    Preferred Language English       Contact Information    Permission Granted to Share Info With     Contact Information Obtained for                    Functional Status       Row Name 12/28/23 1508       Functional Status    Usual Activity Tolerance good    Current Activity Tolerance good       Functional Status, IADL    Medications independent    Meal Preparation independent    Housekeeping independent    Laundry independent    Shopping independent       Mental Status    General Appearance WDL WDL       Mental Status Summary    Recent Changes in Mental Status/Cognitive Functioning no changes                Valente Redd RN     Cell number [phone]  Office number [phone]

## 2023-12-28 NOTE — H&P
Patient Care Team:  Aruna Esquivel MD as PCP - General (Family Medicine)    Chief complaint dizziness    Subjective     Patient is a 68 y.o. female who presents with sensation of head spinning when she changes position.  There was associated nausea and vomiting.  In the ER she was treated with meclizine and IV Benadryl with some relief.  She continued to have balance issues and was admitted for observation.  She was evaluated by physical therapist and felt to have repeat PV and was treated with Stuart-Hallpike maneuvers.  This morning she is feeling quite a bit better.  She is able to ambulate down the tovar to the bathroom independently.  She still has mild residual symptoms but feels that they are manageable.  There is been no recent fever, chills, URI symptoms, headache, ear pain or tinnitus.    Review of Systems   Constitutional:  Positive for activity change. Negative for appetite change, chills, diaphoresis, fatigue, fever and unexpected weight change.   HENT:  Negative for facial swelling, sinus pressure, sinus pain, sneezing and voice change.    Eyes:  Negative for visual disturbance.   Respiratory:  Negative for cough, shortness of breath, wheezing and stridor.    Cardiovascular:  Negative for chest pain, palpitations and leg swelling.   Gastrointestinal:  Negative for abdominal pain, diarrhea, nausea and vomiting.   Endocrine: Negative for polyuria.   Genitourinary:  Negative for dysuria.   Musculoskeletal:  Negative for arthralgias, back pain and gait problem.   Skin:  Negative for rash and wound.   Neurological:  Positive for dizziness. Negative for tremors, syncope, weakness and light-headedness.          History  History reviewed. No pertinent past medical history.  Past Surgical History:   Procedure Laterality Date    HYSTERECTOMY      REDUCTION MAMMAPLASTY       Family History   Problem Relation Age of Onset    Breast cancer Sister     Colon cancer Paternal Uncle      Social History     Tobacco  Use    Smoking status: Never     Passive exposure: Past    Smokeless tobacco: Never   Vaping Use    Vaping Use: Never used   Substance Use Topics    Alcohol use: Defer    Drug use: Never     Medications Prior to Admission   Medication Sig Dispense Refill Last Dose    atorvastatin (LIPITOR) 20 MG tablet Take 1 tablet by mouth Every Night.       cetirizine (zyrTEC) 10 MG tablet Take 1 tablet by mouth Every Night.       meloxicam (MOBIC) 15 MG tablet Take 1 tablet by mouth Every Night.        Allergies:  Codeine, Erythromycin, and Metronidazole    Objective     Vital Signs  Temp:  [98.3 °F (36.8 °C)-98.7 °F (37.1 °C)] 98.3 °F (36.8 °C)  Heart Rate:  [65-92] 76  Resp:  [12-16] 16  BP: (107-152)/(59-88) 124/67     Physical Exam:      General Appearance:    Alert, cooperative, in no acute distress   Head:    Normocephalic, without obvious abnormality, atraumatic   Eyes:            Lids and lashes normal, conjunctivae and sclerae normal, no   icterus, no pallor, corneas clear, PERRLA   Ears:    Ears appear intact with no abnormalities noted   Throat:   No oral lesions, no thrush, oral mucosa moist   Neck:   No adenopathy, supple, trachea midline, no thyromegaly, no   carotid bruit, no JVD   Lungs:     Clear to auscultation,respirations regular, even and                  unlabored    Heart:    Regular rhythm and normal rate, normal S1 and S2, no            murmur, no gallop, no rub, no click   Chest Wall:    No abnormalities observed   Abdomen:     Normal bowel sounds, no masses, no organomegaly, soft        non-tender, non-distended, no guarding, no rebound                tenderness   Extremities:   Moves all extremities well, no edema, no cyanosis, no             redness   Pulses:   Pulses palpable and equal bilaterally   Skin:   No bleeding, bruising or rash   Lymph nodes:   No palpable adenopathy   Neurologic:   Cranial nerves 2 - 12 grossly intact, sensation intact, DTR       present and equal bilaterally       Results  Review:     Imaging Results (Last 24 Hours)       Procedure Component Value Units Date/Time    CT Head Without Contrast [[id number]] Collected: 12/27/23 1621     Updated: 12/27/23 1626    Narrative:      CT HEAD WO CONTRAST    Date of Exam: 12/27/2023 4:08 PM EST    Indication: dizziness.    Comparison: None available.    Technique: Axial CT images were obtained of the head without contrast administration.  Coronal reconstructions were performed.  Automated exposure control and iterative reconstruction methods were used.    Findings:  Mild cerebral volume loss. No midline shift or mass effect. No intracranial hemorrhage. Posterior fossa without acute abnormality. Globes are intact and symmetric. No retro-orbital abnormality. Mastoid air cells are well-aerated. Negative for sinus fluid   level. No calvarial fracture.      Impression:      Impression:  1. No acute intracranial abnormality.  2. Generalized cerebral volume loss.      Electronically Signed: Toñito Alexander MD    12/27/2023 4:24 PM EST    Workstation ID: [id number]             Lab Results (last 24 hours)       Procedure Component Value Units Date/Time    Comprehensive Metabolic Panel [[id number]]  (Abnormal) Collected: 12/27/23 1548    Specimen: Blood Updated: 12/27/23 1625     Glucose 145 mg/dL      BUN 22 mg/dL      Creatinine 0.61 mg/dL      Sodium 142 mmol/L      Potassium 4.1 mmol/L      Chloride 107 mmol/L      CO2 25.0 mmol/L      Calcium 10.3 mg/dL      Total Protein 7.0 g/dL      Albumin 4.6 g/dL      ALT (SGPT) 13 U/L      AST (SGOT) 20 U/L      Alkaline Phosphatase 85 U/L      Total Bilirubin 0.5 mg/dL      Globulin 2.4 gm/dL      A/G Ratio 1.9 g/dL      BUN/Creatinine Ratio 36.1     Anion Gap 10.0 mmol/L      eGFR 96.9 mL/min/1.73     Narrative:      GFR Normal >60  Chronic Kidney Disease <60  Kidney Failure <15      CBC & Differential [[id number]]  (Abnormal) Collected: 12/27/23 1548    Specimen: Blood Updated: 12/27/23 1559    Narrative:      The  following orders were created for panel order CBC & Differential.  Procedure                               Abnormality         Status                     ---------                               -----------         ------                     CBC Auto Differential[[id number]]        Abnormal            Final result                 Please view results for these tests on the individual orders.    CBC Auto Differential [[id number]]  (Abnormal) Collected: 12/27/23 1548    Specimen: Blood Updated: 12/27/23 1559     WBC 9.50 10*3/mm3      RBC 5.10 10*6/mm3      Hemoglobin 14.8 g/dL      Hematocrit 45.6 %      MCV 89.4 fL      MCH 29.1 pg      MCHC 32.6 g/dL      RDW 13.7 %      RDW-SD 42.0 fl      MPV 7.5 fL      Platelets 226 10*3/mm3      Neutrophil % 86.7 %      Lymphocyte % 10.3 %      Monocyte % 2.3 %      Eosinophil % 0.1 %      Basophil % 0.6 %      Neutrophils, Absolute 8.20 10*3/mm3      Lymphocytes, Absolute 1.00 10*3/mm3      Monocytes, Absolute 0.20 10*3/mm3      Eosinophils, Absolute 0.00 10*3/mm3      Basophils, Absolute 0.10 10*3/mm3      nRBC 0.0 /100 WBC              I reviewed the patient's new clinical results.    Assessment & Plan       Vertigo  -Symptom progression and physical exam are consistent with benign positional vertigo.  She is nearly back to normal.  I anticipate discharge later today with meclizine as needed.  If symptoms do not resolve over the next few days she can refer to physical therapy as an outpatient.  She is comfortable going home and feels steady on her feet.        I discussed the patient's findings and my recommendations with patient.     Heather Espinoza MD  12/28/23  13:57 EST

## 2023-12-28 NOTE — DISCHARGE PLACEMENT REQUEST
"Elisa PETIT (68 y.o. Female)       Date of Birth   1955    Social Security Number       Address   [address] [place] [address] [place] [facility]    Home Phone   [phone]    MRN   [id number]       Gnosticism   Non-Scientologist    Marital Status                               Admission Date   12/27/23    Admission Type   Emergency    Admitting Provider   Heather Espinoza MD    Attending Provider   Heather Espinoza MD    Department, Room/Bed   [facility], 1116/1       Discharge Date       Discharge Disposition   Home or Self Care    Discharge Destination                                 Attending Provider: Heather Espinoza MD    Allergies: Codeine, Erythromycin, Metronidazole    Isolation: None   Infection: None   Code Status: CPR    Ht: 163.8 cm (64.5\")   Wt: 72.2 kg (159 lb 2.8 oz)    Admission Cmt: None   Principal Problem: Vertigo [R42]                   Active Insurance as of 12/27/2023       Primary Coverage       Payor Plan Insurance Group Employer/Plan Group    MEDICARE MEDICARE A & B        Payor Plan Address Payor Plan Phone Number Payor Plan Fax Number Effective Dates    [address] [phone]  9/1/2019 - None Entered    [facility] [id number]         Subscriber Name Subscriber Birth Date Member ID       ELISA PETIT 1955 [id number]               Secondary Coverage       Payor Plan Insurance Group Employer/Plan Group    [employer] [place] [employer] [place]        Payor Plan Address Payor Plan Phone Number Payor Plan Fax Number Effective Dates    [address] [place] [phone]  9/1/2019 - None Entered    [place] [address]         Subscriber Name Subscriber Birth Date Member ID       ELISA PETIT 1955 [id number]                     Emergency Contacts        (Rel.) Home Phone Work Phone Mobile Phone    DAMASOMONIQUE (Significant Other) -- -- [phone]                "

## 2023-12-29 NOTE — CASE MANAGEMENT/SOCIAL WORK
Case Management Discharge Note      Final Note: HomeTransportation Services  Private: Car    Final Discharge Disposition Code: 01 - home or self-care

## 2024-01-05 ENCOUNTER — TREATMENT (OUTPATIENT)
Dept: PHYSICAL THERAPY | Facility: CLINIC | Age: 69
End: 2024-01-05
Payer: MEDICARE

## 2024-01-05 DIAGNOSIS — R42 VERTIGO: Primary | ICD-10-CM

## 2024-01-05 PROCEDURE — 97535 SELF CARE MNGMENT TRAINING: CPT | Performed by: PHYSICAL THERAPIST

## 2024-01-05 PROCEDURE — 95992 CANALITH REPOSITIONING PROC: CPT | Performed by: PHYSICAL THERAPIST

## 2024-01-05 PROCEDURE — 97161 PT EVAL LOW COMPLEX 20 MIN: CPT | Performed by: PHYSICAL THERAPIST

## 2024-01-05 NOTE — PROGRESS NOTES
Physical Therapy Initial Evaluation and Plan of Care    Patient: Elisa PETIT   : 1955  Diagnosis/ICD-10 Code:  Vertigo [R42]  Referring practitioner: Aruna Esquivel MD  Date of Initial Visit: 2024  Today's Date: 2024  Patient seen for 1 sessions           Subjective Questionnaire: DHI: 12      Subjective Evaluation    History of Present Illness  Mechanism of injury: Pt is referred to therapy due to dizziness.  It came on suddenly on 23 and got worse the next day therefore went to ER. Kept her one night and was seen by PT for vertigo and BPPV. Reports the rx helped and is doing a lot better but still has mild symptoms.  Feels like the room is spinning.    Onset of symptoms : 23    Aggravating factors: looking up and rolling over in bed to L side    Relieving factors: stay still    Functional limitation: she was limited initially but is doing a lot better was able to go back to work on Monday. She is able to drive and perform her normal / daily activities.     PMH:         Quality of life: good    Pain  No pain reported    Social Support  Lives with: spouse    Patient Goals  Patient goal: resolve dizziness         Precautions:     Objective          Functional Assessment     Comments  Additional subjective information:   Vestibular testing completed:  none   Vision problems/correction: none   Hearing problems: none   History of falls: no      Symptoms last a matter of:  few secs   Symptoms feel like:  spinning   Concurrent symptoms:  HA; n/v      Objective:     Oculomotor and VOR:  deferred     Spontaneous nystagmus:    Gaze-evoked nystagmus:    Skew deviation:    Head-shake nystagmus:    Smooth pursuit:    Saccades:    VORc:    Head thrust:  R/L        SVA:      DVA:  Horiz:              Vert:     LE strength:  Right:             Left:   LE AROM:  Right:                      Left:     Cervical AROM: wnl     Vertebral artery screen: neg B     Cerebellar function:  UE:  finger to  nose: deferred                                                        FABRICE:                                     LE:  FABRICE:                                                       Heel to shin:     BPPV Assessment:    Roll Test:  Right: neg            Left: neg      Emerald Hallpike:  Right: neg                          Left: mild subjective symptoms lasting for about 10-15' , no nystagmus     MCTSIB:  cond 1: deferred   `                           cond 2:                              cond 3:                              cond 4:     Gait: normal , no sign of balance instability    Treatment:  pt received L Epley maneuver , f/b post rx instructions for 24 hrs.  Pt tolerated rx well and demonstrated understanding of her home instructions.  Instructed to reduce caffeine and salt intake and to drink more water.                Assessment & Plan       Assessment  Impairments: activity intolerance   Assessment details: Pt is a 68 y.o. female referred to therapy due to vertigo. Pt presents with sign/ symptoms of BPPV.    L DHP pos today for subjective co , no nystagmus.  Upon initial evaluation pt exhibits the above impairments and functional limitations.  Pt is a good candidate for rehab and will benefit from skilled physical therapy to address impairments, resolve dizziness and maximize function.    Prognosis: good    Goals  Plan Goals: STGs: in 4 weeks    1- Pt will repot at least 50 % improvement and symptom reduction   2- Pt will be independent with initial  HEP if indicated  3- Assess balance and initiate balance activities if indicated      LTGs: By DC     1- Pt will report 100 % improvement and symptom reduction  2- Pt will be independent with self management of her condition   3- Pt will have improved DHI score indicating improved function and symptoms reduction      Plan  Therapy options: will be seen for skilled therapy services  Planned therapy interventions: home exercise program, manual therapy, neuromuscular  re-education and therapeutic activities  Other planned therapy interventions: CRM  Frequency: 1x week  Duration in weeks: 12  Treatment plan discussed with: patient        See flow sheet for treatment detail    History # of Personal Factors and/or Comorbidities: LOW (0)  Examination of Body System(s): # of elements: LOW (1-2)  Clinical Presentation: STABLE   Clinical Decision Making: LOW           Timed:         Manual Therapy:         mins  33152;     Therapeutic Exercise:         mins  99633;     Neuromuscular Christopher:        mins  79263;    Therapeutic Activity:          mins  38594;     Gait Training:           mins  58355;     Ultrasound:          mins  60662;    Ionto                                   mins   38719  Self Care                       10     mins   49222        Un-Timed:  Electrical Stimulation:         mins  16453 ( );  Dry Needling          mins self-pay  Traction          mins 50015  Canal repositioning       15    mins    49381  Low Eval    25      Mins  57652  Mod Eval          Mins  54544  High Eval                            Mins  81188  Re-Eval                               mins  16383        Timed Treatment:  10    mins   Total Treatment:     50   mins    PT SIGNATURE: Johnnie Damon PT, CLT  [place] License: # [id number]     DATE TREATMENT INITIATED: 1/5/2024    Initial Certification  Certification Period: 1/5/2024 thru 4/3/2024  I certify that the therapy services are furnished while this patient is under my care.  The services outlined above are required by this patient, and will be reviewed every 90 days.     PHYSICIAN: Aruna Esquivel MD   NPI: [id number]                                      DATE:     Please sign and return via fax to [phone].. Thank you, [facility].

## 2024-01-10 ENCOUNTER — TREATMENT (OUTPATIENT)
Dept: PHYSICAL THERAPY | Facility: CLINIC | Age: 69
End: 2024-01-10
Payer: MEDICARE

## 2024-01-10 DIAGNOSIS — R42 VERTIGO: Primary | ICD-10-CM

## 2024-01-10 NOTE — PROGRESS NOTES
Physical Therapy Daily Treatment Note    [facility], [address]  [place] [address] [phone]    Patient: Elisa PETIT  : 1955  Referring practitioner: Salo Cosby MD  Diagnosis/ ICD10 code: Vertigo [R42]  Today's Date: 1/10/2024    VISIT#: 2    Subjective   Pt reports: she was doing better but last night when she rolled over to her L side she got dizzy had to move to the couch could go back to sleep. She still feels dizzy.       Objective       BPPV Assessment:                                Emerald Hallpike:  Right: pos for up beating nystagmus immediate onset lasting for about 5'.                             Left: mild subjective symptoms lasting for about 10-15' , no nystagmus. Inc symptoms upon sitting up from testing position. Tested L side for the 2nd time but she had no symptoms at all     Treatment: I chose to treat the R side today.  She still felt somewhat dizzy after the treatment .  Reviewed post treatment precautions. Pt voices understanding.         Patient Education:    Assessment & Plan       Assessment  Assessment details: Pt tolerated today's rx well. Possible B BPPV.  R DHP was positive today, still had subjective symptoms in L testing position.           Progress per Plan of Care            Timed:         Manual Therapy:         mins  75621;     Therapeutic Exercise:         mins  40463;     Neuromuscular Christopher:  13      mins  36256;    Therapeutic Activity:          mins  92635;     Gait Training:           mins  64079;     Ultrasound:          mins  84415;    Ionto                                   mins   27914  Self Care                            mins   91764      Un-Timed:  Electrical Stimulation:         mins  01955 (MC );  Traction          mins 66857  Canal repositioning      10     mins    06259        Timed Treatment:  13    mins   Total Treatment:     23   mins    Johnnie Damon, PT, CLT  Physical Therapist  [place] License:  # [id number]

## 2024-01-12 ENCOUNTER — TELEPHONE (OUTPATIENT)
Dept: PHYSICAL THERAPY | Facility: CLINIC | Age: 69
End: 2024-01-12

## 2024-01-19 ENCOUNTER — TREATMENT (OUTPATIENT)
Dept: PHYSICAL THERAPY | Facility: CLINIC | Age: 69
End: 2024-01-19
Payer: MEDICARE

## 2024-01-19 DIAGNOSIS — R42 VERTIGO: Primary | ICD-10-CM

## 2024-01-19 NOTE — PROGRESS NOTES
Physical Therapy Daily Treatment Note    [facility], [address]  [place] [address]  [phone]    Patient: Elisa PETIT  : 1955  Referring practitioner: Salo Cosby MD  Diagnosis/ ICD10 code: Vertigo [R42]  Today's Date: 2024    VISIT#: 3    Subjective   Pt reports: doing better but still feels it every once in a while. Slept in the recliner for 2 nights went to bed with 2 pillow but that morning when she got up she had some dizziness. Has been sleeping sitting up since her  had surgery had she had to be able to function.       Objective       BPPV Assessment:                                Maybell Hallpike:  Right: pos for up beating nystagmus 10-15' delay, lasting for about 10-15'                             Left: neg      Treatment: performed  R Epley maneuver.   Reviewed post treatment precautions. Pt to sleep in the recliner for 2 nights, use 2-3 pillow when goes to bed and reduce to one gradually.  If still symptomatic return next week if asymptomatic can cx appt next week.          Patient Education:    Assessment & Plan       Assessment  Assessment details:  R DHP still pos today.  Overall doing better but still symptomatic. Tolerated today's rx session well. Demonstrates understanding of post rx instructions.           Progress per Plan of Care            Timed:         Manual Therapy:         mins  35746;     Therapeutic Exercise:         mins  34586;     Neuromuscular Christopher:  13      mins  34019;    Therapeutic Activity:          mins  54720;     Gait Training:           mins  94664;     Ultrasound:          mins  13100;    Ionto                                   mins   13888  Self Care                            mins   94288      Un-Timed:  Electrical Stimulation:         mins  19703 ( );  Traction          mins 64638  Canal repositioning       10    mins    92797        Timed Treatment:  13    mins   Total Treatment:     23   mins    Johnnie Damon PT,  DVAIDE  Physical Therapist  [place] License:  # [id number]

## 2024-01-26 ENCOUNTER — TELEPHONE (OUTPATIENT)
Dept: PHYSICAL THERAPY | Facility: OTHER | Age: 69
End: 2024-01-26
Payer: MEDICARE

## 2024-01-26 NOTE — TELEPHONE ENCOUNTER
Caller: Elisa PETIT    Relationship: Self    What was the call regarding: PATIENT CANCELLED APPT TODAY DUE TO NOT FEELING WELL

## 2024-08-07 ENCOUNTER — TRANSCRIBE ORDERS (OUTPATIENT)
Dept: ADMINISTRATIVE | Facility: HOSPITAL | Age: 69
End: 2024-08-07
Payer: MEDICARE

## 2024-08-07 DIAGNOSIS — Z78.0 ASYMPTOMATIC MENOPAUSE: Primary | ICD-10-CM

## 2024-08-07 DIAGNOSIS — Z12.31 SCREENING MAMMOGRAM FOR BREAST CANCER: ICD-10-CM

## 2024-08-26 ENCOUNTER — HOSPITAL ENCOUNTER (OUTPATIENT)
Dept: MAMMOGRAPHY | Facility: HOSPITAL | Age: 69
Discharge: HOME | End: 2024-08-26
Payer: MEDICARE

## 2024-08-26 ENCOUNTER — HOSPITAL ENCOUNTER (OUTPATIENT)
Dept: BONE DENSITY | Facility: HOSPITAL | Age: 69
Discharge: HOME | End: 2024-08-26
Payer: MEDICARE

## 2024-08-26 DIAGNOSIS — Z78.0 ASYMPTOMATIC MENOPAUSE: ICD-10-CM

## 2024-08-26 DIAGNOSIS — Z12.31 SCREENING MAMMOGRAM FOR BREAST CANCER: ICD-10-CM

## 2024-08-26 PROCEDURE — 77063 BREAST TOMOSYNTHESIS BI: CPT

## 2024-08-26 PROCEDURE — 77080 DXA BONE DENSITY AXIAL: CPT

## 2024-08-26 PROCEDURE — 77067 SCR MAMMO BI INCL CAD: CPT

## 2025-06-09 ENCOUNTER — APPOINTMENT (OUTPATIENT)
Dept: GENERAL RADIOLOGY | Facility: HOSPITAL | Age: 70
End: 2025-06-09
Payer: MEDICARE

## 2025-06-09 ENCOUNTER — HOSPITAL ENCOUNTER (EMERGENCY)
Facility: HOSPITAL | Age: 70
Discharge: HOME | End: 2025-06-09
Admitting: EMERGENCY MEDICINE
Payer: MEDICARE

## 2025-06-09 VITALS
DIASTOLIC BLOOD PRESSURE: 74 MMHG | TEMPERATURE: 98.3 F | HEIGHT: 62 IN | RESPIRATION RATE: 20 BRPM | WEIGHT: 159.39 LBS | OXYGEN SATURATION: 99 % | HEART RATE: 67 BPM | SYSTOLIC BLOOD PRESSURE: 123 MMHG | BODY MASS INDEX: 29.33 KG/M2

## 2025-06-09 DIAGNOSIS — M17.11 ARTHRITIS OF RIGHT KNEE: ICD-10-CM

## 2025-06-09 DIAGNOSIS — M25.561 ACUTE PAIN OF RIGHT KNEE: ICD-10-CM

## 2025-06-09 DIAGNOSIS — S83.8X1A SPRAIN OF MEDIAL MENISCUS OF RIGHT KNEE, INITIAL ENCOUNTER: Primary | ICD-10-CM

## 2025-06-09 PROCEDURE — 97161 PT EVAL LOW COMPLEX 20 MIN: CPT | Performed by: PHYSICAL THERAPIST

## 2025-06-09 PROCEDURE — 73562 X-RAY EXAM OF KNEE 3: CPT

## 2025-06-09 PROCEDURE — 99283 EMERGENCY DEPT VISIT LOW MDM: CPT

## 2025-06-09 NOTE — ED NOTES
Pt here for RLE pain on outside of calf. Pt states she has had throbbing pain intermittently for approx 10 days. Pt denies history of blood clots or recent travel. Pt denies swelling, redness, or fever. Pt states pain woke her up last night and was relieved once she stood on it. Pt on monitor,  at bedside, call light within reach.

## 2025-06-09 NOTE — PLAN OF CARE
ASSESSMENT:   Pt presents with a PT diagnosis of hamstring mild strain and mild meniscal pain that are limiting her ability to perform ADLs. Discussed possibility of patellar bone spurs causing additional pain under the kneecap with the amount of arthritis she has. Discussed HEP for support around knee and issued brace. Recommend OPPT.      Goals:   LTG 1: The patient will be independent in HEP in order to decrease pain and improve tolerance to functional activities.  STATUS: Met    Interventions:   Manual Therapy: none    Therapeutic Exercises: quad set, SLR flex/abduction/ER , squat     Modalities: none      PLAN:  home with OPPT

## 2025-06-09 NOTE — ED PROVIDER NOTES
Subjective   History of Present Illness  69-year-old female presents with a 1 week history of right lateral knee pain that extends into the quadricep and lateral hamstring.  She denies known injury.  She reports it is worse at the end of the day when she has been standing and walking on the leg.  She denies any medial or posterior calf knee pain. Denies fever sweats chills. Denies h/o RA, lupus, inflammatory arthritis.     History provided by:  Patient      Review of Systems   Constitutional:  Negative for fever.   Musculoskeletal:  Positive for arthralgias. Negative for gait problem and myalgias.   Skin:  Negative for color change, pallor, rash and wound.   Neurological:  Negative for weakness and numbness.   Hematological:  Does not bruise/bleed easily.   All other systems reviewed and are negative.      No past medical history on file.    Allergies   Allergen Reactions    Codeine GI Intolerance and Nausea And Vomiting    Erythromycin Nausea And Vomiting    Metronidazole GI Intolerance and Nausea And Vomiting       Past Surgical History:   Procedure Laterality Date    HYSTERECTOMY      REDUCTION MAMMAPLASTY         Family History   Problem Relation Age of Onset    Breast cancer Sister     Colon cancer Paternal Uncle        Social History     Socioeconomic History    Marital status:    Tobacco Use    Smoking status: Never     Passive exposure: Past    Smokeless tobacco: Never   Vaping Use    Vaping status: Never Used   Substance and Sexual Activity    Alcohol use: Defer    Drug use: Never    Sexual activity: Defer           Objective   Physical Exam  Vitals and nursing note reviewed.   Constitutional:       General: She is awake. She is not in acute distress.     Appearance: Normal appearance. She is well-developed and normal weight. She is not ill-appearing.   Cardiovascular:      Pulses:           Dorsalis pedis pulses are 2+ on the right side and 2+ on the left side.        Posterior tibial pulses are 2+  "on the right side and 2+ on the left side.   Musculoskeletal:         General: Tenderness present. No swelling, deformity or signs of injury. Normal range of motion.      Right knee: Bony tenderness present. No swelling or ecchymosis. Normal range of motion. Tenderness present over the lateral joint line. Normal pulse.      Left knee: Normal.        Legs:    Skin:     General: Skin is warm and dry.      Capillary Refill: Capillary refill takes less than 2 seconds.      Coloration: Skin is not pale.      Findings: No bruising or erythema.   Neurological:      Mental Status: She is alert and oriented to person, place, and time.      Sensory: Sensation is intact. No sensory deficit.      Motor: Motor function is intact. No abnormal muscle tone.   Psychiatric:         Behavior: Behavior is cooperative.         Procedures           ED Course  ED Course as of 06/09/25 1740   Mon Jun 09, 2025   1119 Awaiting PT evaluation. [AL]      ED Course User Index  [AL] Gayle Pink, MAKENNA                                                       Medical Decision Making  Problems Addressed:  Acute pain of right knee: complicated acute illness or injury  Arthritis of right knee: complicated acute illness or injury  Sprain of medial meniscus of right knee, initial encounter: complicated acute illness or injury    Amount and/or Complexity of Data Reviewed  Radiology: ordered.    Interpreted by radiologist as below:     XR Knee 3 View Right  Result Date: 6/9/2025  Impression: Mild tricompartmental osteoarthritis. Electronically Signed: Carlos Rodriguez MD  6/9/2025 11:07 AM EDT  Workstation ID: [id number]        /74   Pulse 67   Temp 98.3 °F (36.8 °C) (Oral)   Resp 20   Ht 157.5 cm (62\")   Wt 72.3 kg (159 lb 6.3 oz)   SpO2 99%   BMI 29.15 kg/m²      Lab Results (last 24 hours)       ** No results found for the last 24 hours. **             Medications - No data to display     Appropriate PPE worn during patient exam.  Appropriate " monitoring initiated. Patient is alert and oriented x3.  No acute distress noted. Tenderness right lateral knee extending into the quadricep tendon and right lateral hamstring. 2+ DP/PT pulses. Cap refill <2 seconds. Motor function and sensation intact bilaterally.  X-ray of the right knee obtained with the above findings.  PT evaluated patient determined she is meniscus sprain. Knee sleeve was placed.  Patient was agreeable to outpatient PT order was placed for this.  She was encouraged to elevate and ice her knee every 2 hours while awake.  She is established with orthopedist.  She is encouraged to follow-up with them if her symptoms persist or worsen.  Patient reports she takes meloxicam daily she is encouraged to continue this.    I reviewed chart 12/20/2024 patient was seen by OD for open-angle glaucoma. My radiology interpretation of the right knee shows tricompartmental arthritis, no joint effusion. Imaging was independently interpreted by Dr. Michele. Differential Diagnoses, not all-inclusive and does not constitute entirety of all causes: Joint effusion, arthritis, patella dislocation fracture.  Fracture dislocation effusion ruled out by imaging, arthritis determined by imaging.    Disposition/Treatment: Discussed results with patient, verbalized understanding. Discussed reasons to return, patient verbalized understanding. Agreeable with plan of care. Patient was stable upon disposition.    Upon reassessment, patient is flesh tone warm and dry no acute distress noted.  Vital signs are stable. Discussed return precautions, patient verbalized understanding.     Part of this note may be an electronic transcription/translation of spoken language to printed text using the Dragon Dictation System.   Final diagnoses:   Sprain of medial meniscus of right knee, initial encounter   Acute pain of right knee   Arthritis of right knee       ED Disposition  ED Disposition       ED Disposition   Discharge    Condition    Stable    Comment   --               Aruna Esquivel MD  [address] [facility] [place] [address] [phone]    Schedule an appointment as soon as possible for a visit       Kyrie Hand MD  [address] [facility] [place] [address] [phone]    Schedule an appointment as soon as possible for a visit       [facility] EMERGENCY DEPARTMENT  [address] [address] [phone]  Go to   If symptoms worsen         Medication List      No changes were made to your prescriptions during this visit.            Gayle Pink, APRN  06/09/25 3387

## 2025-06-09 NOTE — THERAPY EVALUATION
Patient Name: Elisa PETIT  : 1955    MRN: [id number]                              Today's Date: 2025       Admit Date: 2025    Visit Dx:     ICD-10-CM ICD-9-CM   1. Sprain of medial meniscus of right knee, initial encounter  S83.8X1A 844.8   2. Acute pain of right knee  M25.561 719.46   3. Arthritis of right knee  M17.11 716.96     Patient Active Problem List   Diagnosis    Vertigo     No past medical history on file.  Past Surgical History:   Procedure Laterality Date    HYSTERECTOMY      REDUCTION MAMMAPLASTY         SUBJECTIVE:    Pt with knee pain that started to worsen about 10 days ago. No specific injury, however she reports being very active at her work as a  and carrying birdseed/ cat food around home.     OBJECTIVE:  Imaging:  tricompartmental osteoarthritis   AROM - WFL  MMT LE - no pain with testing 4+/5 grossly RLE   SPECIAL TESTING   Anterior drawer - negative    Posterior drawer - negative    Lachmann's  - NT    Varus test - negative    Valgus test - negative    Isaias - mild positive - medial    Bounce home - NT    Patellar apprehension test - negative    PALPATION - hamstring tenderness   GAIT - antalgic mild   SENSATION - intac t      ASSESSMENT:   Pt presents with a PT diagnosis of hamstring mild strain and mild meniscal pain that are limiting her ability to perform ADLs. Discussed possibility of patellar bone spurs causing additional pain under the kneecap with the amount of arthritis she has. Discussed HEP for support around knee and issued brace. Recommend OPPT.      Goals:   LTG 1: The patient will be independent in HEP in order to decrease pain and improve tolerance to functional activities.  STATUS: Met    Interventions:   Manual Therapy: none    Therapeutic Exercises: quad set, SLR flex/abduction/ER , squat     Modalities: none      PLAN:  home with OPPT        Time Calculation:   PT Evaluation Complexity  History, PT Evaluation Complexity: 1-2 personal  factors and/or comorbidities  Examination of Body Systems (PT Eval Complexity): 1-2 elements  Clinical Presentation (PT Evaluation Complexity): stable  Clinical Decision Making (PT Evaluation Complexity): low complexity  Overall Complexity (PT Evaluation Complexity): low complexity     PT Charges       Row Name 06/09/25 1344             Time Calculation    Start Time 1253  -AD      Stop Time 1315  -AD      Time Calculation (min) 22 min  -AD      PT Received On 06/09/25  -AD         Time Calculation- PT    Total Timed Code Minutes- PT 0 minute(s)  -AD                User Key  (r) = Recorded By, (t) = Taken By, (c) = Cosigned By      Initials Name Provider Type    AD Lizzeth Vance, PT Physical Therapist                  Therapy Charges for Today       Code Description Service Date Service Provider Modifiers Qty    19427808698 HC PT EVAL LOW COMPLEXITY 4 6/9/2025 Lizzeth Vance, PT GP 1               PT Discharge Summary  Anticipated Discharge Disposition (PT): home with outpatient therapy services    Lizzeth Vance PT  6/9/2025

## 2025-06-09 NOTE — DISCHARGE INSTRUCTIONS
Wear brace as directed. Apply ice every 2 hours while awake, on for 20 minutes at a time. Perform exercises per hand out. Make sure you are drinking at least 80 ounces of water daily. Continue home medication regimen. Schedule follow up with primary care for recheck. Call Dr. Hand as needed. Go to ED for any new or worsening symptoms.

## 2025-06-24 ENCOUNTER — TREATMENT (OUTPATIENT)
Dept: PHYSICAL THERAPY | Facility: CLINIC | Age: 70
End: 2025-06-24
Payer: MEDICARE

## 2025-06-24 DIAGNOSIS — S83.8X1A SPRAIN OF MEDIAL MENISCUS OF RIGHT KNEE, INITIAL ENCOUNTER: Primary | ICD-10-CM

## 2025-06-24 DIAGNOSIS — M17.11 ARTHRITIS OF RIGHT KNEE: ICD-10-CM

## 2025-06-24 DIAGNOSIS — M25.561 ACUTE PAIN OF RIGHT KNEE: ICD-10-CM

## 2025-06-24 PROCEDURE — 97161 PT EVAL LOW COMPLEX 20 MIN: CPT | Performed by: PHYSICAL THERAPIST

## 2025-06-24 PROCEDURE — 97110 THERAPEUTIC EXERCISES: CPT | Performed by: PHYSICAL THERAPIST

## 2025-06-24 NOTE — PROGRESS NOTES
Physical Therapy Initial Evaluation and Plan of Care        [address] [facility], [address] [place] [address]     Patient: Elisa PETIT   : 1955  Diagnosis/ICD-10 Code:  Sprain of medial meniscus of right knee, initial encounter [S83.8X1A]  Referring practitioner: MAKENNA Townsend  Date of Initial Visit: 2025  Today's Date: 2025  Patient seen for 1 sessions           Subjective Questionnaire: LEFS: 46/80      Subjective Evaluation    History of Present Illness  Mechanism of injury: Patient presents to PT for evaluation of right knee pain that began 3 weeks ago with insidious onset. She presented to the ED for this pain on 25 and was provided with a soft Don Yuliana brace which she wears when lifting at work. Patient reports pain location as the lateral right knee, anterior knee and down anterior and lateral shin. Reports there was burning in the back of thigh at time she presented to the ED though it has gone away.     She reports the pain is improving since onset. Pain worsens as the day goes on. Pain is worsened by sitting and driving. Pain interrupts her sleep. Minimal pain walking. Patient with stairs in her home, pain going up and has been going down stairs backwards to avoid pressure. Occasional catch in the knee going up the stairs that is new. Has had had prior issues with both knees buckling, history of arthritis with prior injections in left knee.     Occupation: vet tech  Activities: bowling  PLOF: Independent  Medical Hx Reviewed.      Pain  Current pain ratin  At best pain ratin  At worst pain ratin  Location: right knee  Quality: dull ache    Patient Goals  Patient goals for therapy: increased motion, decreased pain, increased strength and return to sport/leisure activities               Objective          Observations     Additional Knee Observation Details  Minimal right knee swelling.   Patient with hardened skin on bilateral patellas from kneeling at  work    Palpation     Right   No palpable tenderness to the rectus femoris.     Additional Palpation Details  Proximal right gastroc - tender    Tenderness     Right Knee   No tenderness in the inferior fat pad, inferior patella, ITB, lateral joint line, lateral patella, LCL (distal), LCL (proximal), MCL (distal), MCL (proximal), medial joint line, medial patella, patellar tendon, pes anserinus, quadriceps tendon, superior patella and tibial tubercle.     Active Range of Motion   Left Knee   Flexion: 130 degrees   Extension: 5 degrees     Right Knee   Flexion: 125 degrees with pain  Extension: 3 degrees     Passive Range of Motion   Left Hip   Extension: WFL  External rotation (90/90): WFL  Internal rotation (90/90): WFL    Right Hip   Extension: WFL  External rotation (90/90): WFL  Internal rotation (90/90): WFL    Patellar Mobility   Left Knee Patellar tendons within functional limits include the medial, lateral, superior and inferior.     Right Knee Patellar tendons within functional limits include the superior and inferior. Hypomobile in the medial and lateral patellar tendon(s).     Strength/Myotome Testing     Left Hip   Planes of Motion   Flexion: 5  Extension: 4+  Abduction: 4+  External rotation: 5  Internal rotation: 5    Right Hip   Planes of Motion   Flexion: 5  Extension: 4+  Abduction: 4+  External rotation: 5  Internal rotation: 5    Left Knee   Flexion: 5  Extension: 5  Quadriceps contraction: good    Right Knee   Flexion: 5 (pain anterior knee)  Extension: 5  Quadriceps contraction: good    Left Ankle/Foot   Dorsiflexion: 5  Plantar flexion: 5    Right Ankle/Foot   Dorsiflexion: 5  Plantar flexion: 5 (pain)    Additional Strength Details  SLR WNL on R    Tests     Right Knee   Positive Apley's compression and medial Isaias.   Negative anterior drawer, lateral Isaisa, posterior drawer, Thessaly's test at 20 degrees, valgus stress test at 0 degrees, valgus stress test at 30 degrees, varus stress  "test at 0 degrees and varus stress test at 30 degrees.     Left Knee Flexibility Comments:   Hamstrings WNL   Hip flexors WNL    Right Knee Flexibility Comments:   Hamstrings WNL  Hip flexors WNL         General Comments     Knee Comments  Ambulates normally and independently.   Stepping up 6\": slight R anterior knee pain  Stepping down 6\": slight R anterior knee pain that improves with repetitions     See Exercise, Manual, and Modality Logs for complete treatment.     Assessment & Plan       Assessment  Impairments: abnormal or restricted ROM, activity intolerance, lacks appropriate home exercise program and pain with function   Functional limitations: carrying objects, lifting, sleeping, walking, pushing, uncomfortable because of pain, sitting, standing and stooping   Assessment details: The patient is a 69 y.o. female who presents to physical therapy today for anterolateral right knee pain that radiates into her proximal lower leg. Upon initial evaluation, the patient demonstrates the following impairments: decreased right knee flexion AROM to 125 degrees with pain (compared to 130 degrees on the left side),  Positive medial McMurrays test and Appleys compression test, and tenderness to her right proximal gastrocnemius. Testing aligns with ED diagnosis of medial meniscus sprain, though patient's symptoms of pain with sitting also point to potential compressive forces on the patella and knee osteoarthritis. Due to these impairments, the patient is unable to lift heavy items, squat, or sit for long periods without pain. The patient would benefit from PT services to address functional limitations and impairments and to improve patient quality of life.      Prognosis: good    Goals  Plan Goals: SHORT TERM GOALS: 6 weeks    1.  Patient to be compliant with HEP.   2.  Pt able to ascend/descend 6' steps and transfer with knee pain < 3/10  3.  Pt to report ability to sit for >20 minutes with knee pain no >2/10.  4.  Pt. " "to exhibit increased LE endurance/strength to 5/5 to allow for increased ease with sit-stand and standing/walking > 30 minutes    LONG TERM GOALS: 12 weeks  1.  Pt to score < 15% perceived disability on WOMAC / LEFS  2.  Patient able to ascend/descend 8\" steps and prolonged standing & cooking with pain < 2/10  3.  Pt to exhibit full knee AROM to allow for kneeling, bending squatting as is necessary for ADL's and household activities.   4.  Pt to exhibit LE endurance/strength to 5/5 without pain to allow for walking, steps and transfers to occur safely  5.  Pt to demonstrate increased stability of the knee to balance on foam with right leg for 10 seconds as needed to traverse uneven terrain .              Plan  Therapy options: will be seen for skilled therapy services  Planned modality interventions: dry needling, cryotherapy, TENS, electrical stimulation/Russian stimulation and thermotherapy (hydrocollator packs)  Planned therapy interventions: abdominal trunk stabilization, ADL retraining, manual therapy, neuromuscular re-education, postural training, soft tissue mobilization, spinal/joint mobilization, strengthening, stretching, home exercise program, gait training, functional ROM exercises, flexibility, joint mobilization, therapeutic activities, compression, balance/weight-bearing training and body mechanics training  Frequency: 2x week  Duration in weeks: 12  Treatment plan discussed with: patient        Visit Diagnoses:    ICD-10-CM ICD-9-CM   1. Sprain of medial meniscus of right knee, initial encounter  S83.8X1A 844.8   2. Acute pain of right knee  M25.561 719.46   3. Arthritis of right knee  M17.11 716.96       History # of Personal Factors and/or Comorbidities: MODERATE (1-2)  Examination of Body System(s): # of elements: LOW (1-2)  Clinical Presentation: STABLE   Clinical Decision Making: LOW       Timed:         Manual Therapy:         mins  97006;     Therapeutic Exercise:    10     mins  20404;   "   Neuromuscular Christopher:        mins  04438;    Therapeutic Activity:          mins  80639;     Gait Training:           mins  20228;     Ultrasound:          mins  92188;    Ionto                                   mins   15067  Self Care                            mins   99684  Canalith Repos         mins 48632      Un-Timed:  Electrical Stimulation:         mins  11833 ( );  Dry Needling          mins self-pay  Traction          mins 56966  Low Eval     35     Mins  44254  Mod Eval          Mins  82178  High Eval                            Mins  43357  Re-Eval                               mins  02667    Timed Treatment:   10   mins   Total Treatment:     45   mins    PT SIGNATURE: Annamaria Lockwood PT         Initial Certification  Certification Period: 6/24/2025 thru 9/22/2025  I certify that the therapy services are furnished while this patient is under my care.  The services outlined above are required by this patient, and will be reviewed every 90 days.     PHYSICIAN: Gayle Pink APRN      DATE:     Please sign and return via fax to [phone].. Thank you, [facility].

## 2025-07-08 ENCOUNTER — TREATMENT (OUTPATIENT)
Dept: PHYSICAL THERAPY | Facility: CLINIC | Age: 70
End: 2025-07-08
Payer: MEDICARE

## 2025-07-08 DIAGNOSIS — M17.11 ARTHRITIS OF RIGHT KNEE: ICD-10-CM

## 2025-07-08 DIAGNOSIS — S83.8X1A SPRAIN OF MEDIAL MENISCUS OF RIGHT KNEE, INITIAL ENCOUNTER: Primary | ICD-10-CM

## 2025-07-08 DIAGNOSIS — M25.561 ACUTE PAIN OF RIGHT KNEE: ICD-10-CM

## 2025-07-08 PROCEDURE — 97110 THERAPEUTIC EXERCISES: CPT | Performed by: PHYSICAL THERAPIST

## 2025-07-08 PROCEDURE — 97140 MANUAL THERAPY 1/> REGIONS: CPT | Performed by: PHYSICAL THERAPIST

## 2025-07-08 NOTE — PROGRESS NOTES
Physical Therapy Daily Treatment Note  [facility]  [address] [facility] [address], [address]   [place] [address]     Patient: Elisa PETIT   : 1955  Referring practitioner: No ref. provider found  Diagnosis:      ICD-10-CM ICD-9-CM   1. Sprain of medial meniscus of right knee, initial encounter  S83.8X1A 844.8   2. Acute pain of right knee  M25.561 719.46   3. Arthritis of right knee  M17.11 716.96     Date of Initial Visit: Type: THERAPY  Noted: 2025  Today's Date: 2025    VISIT#: 2            Subjective   Elisa PETIT reports: knee does not hurt as much at rest, still hurts when she bends the knee to get in car. Knee will be tired by the end of work day, especially if not wearing the brace. Gets some vertigo if she lies flat, so is rolling a rug below her head during HEP to avoid that.     Objective   See Exercise, Manual, and Modality Logs for complete treatment.       ASSESSMENT:   Patient demonstrates improvement in smoothness of gait and decreased pain with exercise compared to at initial evaluation.       PLAN:  Continue to progress quadriceps strengthening and functional activity as patient tolerance allows.       Timed:  Manual Therapy:    8     mins  34838;  Therapeutic Exercise:    20     mins  81525;     Neuromuscular Christopher:        mins  80697;    Therapeutic Activity:          mins  32808;     Gait Training:           mins  52646;     Ultrasound:          mins  65002;    Electrical Stimulation:         mins  88040 ( );    Untimed:  Electrical Stimulation:         mins  92598 ( );  Mechanical Traction:         mins  44051;   Dry needling:       Self pay      Timed Treatment:   28   mins   Total Treatment:     28   mins      Annamaria Lockwood PT, DPT  Physical Therapist  [place] Lic #: [id number]

## 2025-07-15 ENCOUNTER — TELEPHONE (OUTPATIENT)
Dept: PHYSICAL THERAPY | Facility: CLINIC | Age: 70
End: 2025-07-15

## 2025-07-18 ENCOUNTER — TREATMENT (OUTPATIENT)
Dept: PHYSICAL THERAPY | Facility: CLINIC | Age: 70
End: 2025-07-18
Payer: MEDICARE

## 2025-07-18 DIAGNOSIS — M25.561 ACUTE PAIN OF RIGHT KNEE: ICD-10-CM

## 2025-07-18 DIAGNOSIS — S83.8X1A SPRAIN OF MEDIAL MENISCUS OF RIGHT KNEE, INITIAL ENCOUNTER: Primary | ICD-10-CM

## 2025-07-18 DIAGNOSIS — M17.11 ARTHRITIS OF RIGHT KNEE: ICD-10-CM

## 2025-07-18 NOTE — PROGRESS NOTES
Physical Therapy Daily Treatment Note  [facility] - [facility]  [address] [facility] [address], [address]   [place] [address]     Patient: Elisa PETIT   : 1955  Referring practitioner: MAKENNA Townsend  Diagnosis:      ICD-10-CM ICD-9-CM   1. Sprain of medial meniscus of right knee, initial encounter  S83.8X1A 844.8   2. Acute pain of right knee  M25.561 719.46   3. Arthritis of right knee  M17.11 716.96     Date of Initial Visit: Type: THERAPY  Noted: 2025  Today's Date: 2025    VISIT#: 3            Subjective   Elisa PETIT reports: remains with some right knee pain when ambulating stairs and kneeling, getting in and out of car - anything that involves knee bending. The pain is less than prior. Getting some hamstring discomfort last few days.     Objective   See Exercise, Manual, and Modality Logs for complete treatment.       ASSESSMENT:   Instructed to take time off glute bridge exercise if hamstrings are sore. No pain with quadriceps based exercises today.       PLAN:  Continue quadriceps strengthening, incorporate glute medius and stability.       Timed:  Manual Therapy:    4     mins  31078;  Therapeutic Exercise:    11     mins  17213;     Neuromuscular Christopher:        mins  05686;    Therapeutic Activity:     15     mins  98029;     Gait Training:           mins  41827;     Ultrasound:          mins  55389;    Electrical Stimulation:         mins  85102 ( );    Untimed:  Electrical Stimulation:         mins  84763 ( );  Mechanical Traction:         mins  71493;   Dry needling:       Self pay      Timed Treatment:   30   mins   Total Treatment:     30   mins      Annamaria Lockwood PT, DPT  Physical Therapist  [place] Lic #: [id number]

## 2025-07-22 ENCOUNTER — TREATMENT (OUTPATIENT)
Dept: PHYSICAL THERAPY | Facility: CLINIC | Age: 70
End: 2025-07-22
Payer: MEDICARE

## 2025-07-22 DIAGNOSIS — M17.11 ARTHRITIS OF RIGHT KNEE: ICD-10-CM

## 2025-07-22 DIAGNOSIS — M25.561 ACUTE PAIN OF RIGHT KNEE: ICD-10-CM

## 2025-07-22 DIAGNOSIS — S83.8X1A SPRAIN OF MEDIAL MENISCUS OF RIGHT KNEE, INITIAL ENCOUNTER: Primary | ICD-10-CM

## 2025-07-22 PROCEDURE — 97110 THERAPEUTIC EXERCISES: CPT | Performed by: PHYSICAL THERAPIST

## 2025-07-22 PROCEDURE — 97140 MANUAL THERAPY 1/> REGIONS: CPT | Performed by: PHYSICAL THERAPIST

## 2025-07-22 PROCEDURE — 97530 THERAPEUTIC ACTIVITIES: CPT | Performed by: PHYSICAL THERAPIST

## 2025-07-22 NOTE — PROGRESS NOTES
Physical Therapy Daily Treatment Note  [facility] - [facility]  [address], [address]   [place] [address]     Patient: Elisa PETIT   : 1955  Referring practitioner: MAKENNA Townsend  Diagnosis:      ICD-10-CM ICD-9-CM   1. Sprain of medial meniscus of right knee, initial encounter  S83.8X1A 844.8   2. Acute pain of right knee  M25.561 719.46   3. Arthritis of right knee  M17.11 716.96     Date of Initial Visit: Type: THERAPY  Noted: 2025  Today's Date: 2025    VISIT#: 4            Subjective   Elisa PETIT reports: the last few days she has not felt any knee pain, has been doing HEP. Has not had pain standing long periods for last couple days. No muscle soreness after last session.     Objective   See Exercise, Manual, and Modality Logs for complete treatment.       ASSESSMENT:   Performs exercise without pain. Able to increase ankle weight and step height.       PLAN:  Likely DC next session      Timed:  Manual Therapy:    8     mins  13322;  Therapeutic Exercise:    15     mins  85771;     Neuromuscular Christopher:        mins  52717;    Therapeutic Activity:     15     mins  44174;     Gait Training:           mins  83354;     Ultrasound:          mins  10769;    Electrical Stimulation:         mins  55647 ( );    Untimed:  Electrical Stimulation:         mins  97181 ( );  Mechanical Traction:         mins  12455;   Dry needling:       Self pay      Timed Treatment:   38   mins   Total Treatment:     38   mins      Annamaria Lockwood PT, DPT  Physical Therapist  [place] Lic #: [id number]

## 2025-07-29 ENCOUNTER — TREATMENT (OUTPATIENT)
Dept: PHYSICAL THERAPY | Facility: CLINIC | Age: 70
End: 2025-07-29
Payer: MEDICARE

## 2025-07-29 DIAGNOSIS — M17.11 ARTHRITIS OF RIGHT KNEE: ICD-10-CM

## 2025-07-29 DIAGNOSIS — M25.561 ACUTE PAIN OF RIGHT KNEE: ICD-10-CM

## 2025-07-29 DIAGNOSIS — S83.8X1A SPRAIN OF MEDIAL MENISCUS OF RIGHT KNEE, INITIAL ENCOUNTER: Primary | ICD-10-CM

## 2025-07-29 PROCEDURE — 97530 THERAPEUTIC ACTIVITIES: CPT | Performed by: PHYSICAL THERAPIST

## 2025-07-29 PROCEDURE — 97110 THERAPEUTIC EXERCISES: CPT | Performed by: PHYSICAL THERAPIST

## 2025-07-29 NOTE — PROGRESS NOTES
Physical Therapy Daily Treatment Note  [facility] - [facility]  [address], [address]   [place] [address]     Patient: Elisa PETIT   : 1955  Referring practitioner: MAKENNA Townsend  Diagnosis:      ICD-10-CM ICD-9-CM   1. Sprain of medial meniscus of right knee, initial encounter  S83.8X1A 844.8   2. Acute pain of right knee  M25.561 719.46   3. Arthritis of right knee  M17.11 716.96     Date of Initial Visit: Type: THERAPY  Noted: 2025  Today's Date: 2025    VISIT#: 5            Subjective   Elisa PETIT reports: she feels ready for discharge, has no pain during daily activities    LEFS: 93% function    Objective   Right knee AROM: 3-0-127    See Exercise, Manual, and Modality Logs for complete treatment.       ASSESSMENT:   Patient has slight discomfort in end range knee flexion, potential remaining meniscus irritation/arthritis. Tolerates all exercise without pain. Meets all written goals, appropriate for discharge today.      PLAN:  DC to HEP 3x/week      Timed:  Manual Therapy:         mins  15777;  Therapeutic Exercise:    20     mins  18957;     Neuromuscular Christopher:        mins  19790;    Therapeutic Activity:     10     mins  84694;     Gait Training:           mins  14612;     Ultrasound:          mins  47931;    Electrical Stimulation:         mins  81758 ( );    Untimed:  Electrical Stimulation:         mins  26390 ( );  Mechanical Traction:         mins  71023;   Dry needling:       Self pay      Timed Treatment:   30   mins   Total Treatment:     30   mins      Annamaria Lockwood PT, DPT  Physical Therapist  [place] Lic #: [id number]